# Patient Record
Sex: FEMALE | Race: OTHER | NOT HISPANIC OR LATINO | URBAN - METROPOLITAN AREA
[De-identification: names, ages, dates, MRNs, and addresses within clinical notes are randomized per-mention and may not be internally consistent; named-entity substitution may affect disease eponyms.]

---

## 2017-01-15 ENCOUNTER — EMERGENCY (EMERGENCY)
Facility: HOSPITAL | Age: 32
LOS: 1 days | Discharge: HOME | End: 2017-01-15
Attending: EMERGENCY MEDICINE | Admitting: EMERGENCY MEDICINE
Payer: SELF-PAY

## 2017-01-15 VITALS
HEART RATE: 69 BPM | OXYGEN SATURATION: 100 % | DIASTOLIC BLOOD PRESSURE: 65 MMHG | TEMPERATURE: 98 F | RESPIRATION RATE: 18 BRPM | SYSTOLIC BLOOD PRESSURE: 109 MMHG

## 2017-01-15 VITALS
TEMPERATURE: 98 F | HEART RATE: 73 BPM | DIASTOLIC BLOOD PRESSURE: 75 MMHG | SYSTOLIC BLOOD PRESSURE: 116 MMHG | OXYGEN SATURATION: 98 % | RESPIRATION RATE: 18 BRPM

## 2017-01-15 DIAGNOSIS — R10.9 UNSPECIFIED ABDOMINAL PAIN: ICD-10-CM

## 2017-01-15 DIAGNOSIS — N30.01 ACUTE CYSTITIS WITH HEMATURIA: ICD-10-CM

## 2017-01-15 DIAGNOSIS — Z72.0 TOBACCO USE: ICD-10-CM

## 2017-01-15 LAB
ALBUMIN SERPL ELPH-MCNC: 3.7 G/DL — SIGNIFICANT CHANGE UP (ref 3.4–5)
ALP SERPL-CCNC: 44 U/L — SIGNIFICANT CHANGE UP (ref 40–120)
ALT FLD-CCNC: 16 U/L — SIGNIFICANT CHANGE UP (ref 12–42)
ANION GAP SERPL CALC-SCNC: 7 MMOL/L — LOW (ref 9–16)
APPEARANCE UR: CLEAR — SIGNIFICANT CHANGE UP
AST SERPL-CCNC: 15 U/L — SIGNIFICANT CHANGE UP (ref 15–37)
BACTERIA # UR AUTO: PRESENT /HPF
BILIRUB SERPL-MCNC: 0.8 MG/DL — SIGNIFICANT CHANGE UP (ref 0.2–1.2)
BILIRUB UR-MCNC: NEGATIVE — SIGNIFICANT CHANGE UP
BUN SERPL-MCNC: 8 MG/DL — SIGNIFICANT CHANGE UP (ref 7–23)
CALCIUM SERPL-MCNC: 9.1 MG/DL — SIGNIFICANT CHANGE UP (ref 8.5–10.5)
CHLORIDE SERPL-SCNC: 106 MMOL/L — SIGNIFICANT CHANGE UP (ref 96–108)
CK MB BLD-MCNC: <0.89 % — SIGNIFICANT CHANGE UP
CK MB CFR SERPL CALC: <0.5 NG/ML — LOW (ref 0.5–3.6)
CK SERPL-CCNC: 56 U/L — SIGNIFICANT CHANGE UP (ref 26–192)
CO2 SERPL-SCNC: 27 MMOL/L — SIGNIFICANT CHANGE UP (ref 22–31)
COLOR SPEC: YELLOW — SIGNIFICANT CHANGE UP
CREAT SERPL-MCNC: 0.84 MG/DL — SIGNIFICANT CHANGE UP (ref 0.5–1.3)
DIFF PNL FLD: (no result)
GLUCOSE SERPL-MCNC: 77 MG/DL — SIGNIFICANT CHANGE UP (ref 70–99)
GLUCOSE UR QL: NEGATIVE — SIGNIFICANT CHANGE UP
HCG UR QL: NEGATIVE — SIGNIFICANT CHANGE UP
HCT VFR BLD CALC: 37.5 % — SIGNIFICANT CHANGE UP (ref 34.5–45)
HGB BLD-MCNC: 12.3 G/DL — SIGNIFICANT CHANGE UP (ref 11.5–15.5)
HIV 1 & 2 AB SERPL IA.RAPID: SIGNIFICANT CHANGE UP
KETONES UR-MCNC: NEGATIVE — SIGNIFICANT CHANGE UP
LEUKOCYTE ESTERASE UR-ACNC: (no result)
LIDOCAIN IGE QN: 360 U/L — SIGNIFICANT CHANGE UP (ref 73–393)
MCHC RBC-ENTMCNC: 28 PG — SIGNIFICANT CHANGE UP (ref 27–34)
MCHC RBC-ENTMCNC: 32.8 G/DL — SIGNIFICANT CHANGE UP (ref 32–36)
MCV RBC AUTO: 85.2 FL — SIGNIFICANT CHANGE UP (ref 80–100)
NITRITE UR-MCNC: NEGATIVE — SIGNIFICANT CHANGE UP
PH UR: 6 — SIGNIFICANT CHANGE UP (ref 4–8.1)
PLATELET # BLD AUTO: 232 K/UL — SIGNIFICANT CHANGE UP (ref 150–400)
POTASSIUM SERPL-MCNC: 3.9 MMOL/L — SIGNIFICANT CHANGE UP (ref 3.5–5.3)
POTASSIUM SERPL-SCNC: 3.9 MMOL/L — SIGNIFICANT CHANGE UP (ref 3.5–5.3)
PROT SERPL-MCNC: 7.2 G/DL — SIGNIFICANT CHANGE UP (ref 6.4–8.2)
PROT UR-MCNC: (no result) MG/DL
RBC # BLD: 4.4 M/UL — SIGNIFICANT CHANGE UP (ref 3.8–5.2)
RBC # FLD: 12.9 % — SIGNIFICANT CHANGE UP (ref 10.3–16.9)
RBC CASTS # UR COMP ASSIST: (no result) /HPF
SODIUM SERPL-SCNC: 140 MMOL/L — SIGNIFICANT CHANGE UP (ref 132–145)
SP GR SPEC: 1.02 — SIGNIFICANT CHANGE UP (ref 1–1.03)
TROPONIN I SERPL-MCNC: <0.017 NG/ML — LOW (ref 0.02–0.06)
UROBILINOGEN FLD QL: 0.2 E.U./DL — SIGNIFICANT CHANGE UP
WBC # BLD: 4.8 K/UL — SIGNIFICANT CHANGE UP (ref 3.8–10.5)
WBC # FLD AUTO: 4.8 K/UL — SIGNIFICANT CHANGE UP (ref 3.8–10.5)
WBC UR QL: (no result) /HPF

## 2017-01-15 PROCEDURE — 99285 EMERGENCY DEPT VISIT HI MDM: CPT | Mod: 25

## 2017-01-15 PROCEDURE — 93010 ELECTROCARDIOGRAM REPORT: CPT

## 2017-01-15 RX ORDER — FAMOTIDINE 10 MG/ML
20 INJECTION INTRAVENOUS ONCE
Qty: 0 | Refills: 0 | Status: COMPLETED | OUTPATIENT
Start: 2017-01-15 | End: 2017-01-15

## 2017-01-15 RX ORDER — FLUCONAZOLE 150 MG/1
1 TABLET ORAL
Qty: 1 | Refills: 0 | OUTPATIENT
Start: 2017-01-15

## 2017-01-15 RX ORDER — SODIUM CHLORIDE 9 MG/ML
1000 INJECTION INTRAMUSCULAR; INTRAVENOUS; SUBCUTANEOUS ONCE
Qty: 0 | Refills: 0 | Status: COMPLETED | OUTPATIENT
Start: 2017-01-15 | End: 2017-01-15

## 2017-01-15 RX ORDER — AZTREONAM 2 G
1 VIAL (EA) INJECTION
Qty: 20 | Refills: 0 | OUTPATIENT
Start: 2017-01-15 | End: 2017-01-25

## 2017-01-15 RX ORDER — ONDANSETRON 8 MG/1
4 TABLET, FILM COATED ORAL ONCE
Qty: 0 | Refills: 0 | Status: COMPLETED | OUTPATIENT
Start: 2017-01-15 | End: 2017-01-15

## 2017-01-15 RX ORDER — KETOROLAC TROMETHAMINE 30 MG/ML
30 SYRINGE (ML) INJECTION ONCE
Qty: 0 | Refills: 0 | Status: DISCONTINUED | OUTPATIENT
Start: 2017-01-15 | End: 2017-01-15

## 2017-01-15 RX ADMIN — ONDANSETRON 4 MILLIGRAM(S): 8 TABLET, FILM COATED ORAL at 11:55

## 2017-01-15 RX ADMIN — SODIUM CHLORIDE 2000 MILLILITER(S): 9 INJECTION INTRAMUSCULAR; INTRAVENOUS; SUBCUTANEOUS at 11:55

## 2017-01-15 RX ADMIN — Medication 1 TABLET(S): at 13:46

## 2017-01-15 RX ADMIN — Medication 30 MILLIGRAM(S): at 13:41

## 2017-01-15 RX ADMIN — FAMOTIDINE 20 MILLIGRAM(S): 10 INJECTION INTRAVENOUS at 11:55

## 2017-01-15 RX ADMIN — Medication 30 MILLIGRAM(S): at 11:55

## 2017-01-15 NOTE — ED ADULT NURSE NOTE - CHIEF COMPLAINT QUOTE
Pt states a week ago she has a bad UTI was treated in the hospital and was given a script for home. Pt admits to not taking medication at home b/c she said it got better. Now she feels pressure/ bloating in her abdomin and lower back pain. Last BM was yesterday.

## 2017-01-15 NOTE — ED PROVIDER NOTE - GASTROINTESTINAL NEGATIVE STATEMENT, MLM
no abdominal tenderness, no bloating, mild constipation, no diarrhea, no nausea and no vomiting. (is still able to eat and is having BMs ever other day instead of her normal every day)

## 2017-01-15 NOTE — ED PROVIDER NOTE - FAMILY HISTORY
Mother  Still living? Unknown  Family history of essential hypertension, Age at diagnosis: Age Unknown

## 2017-01-15 NOTE — ED ADULT NURSE NOTE - OBJECTIVE STATEMENT
Pt A&OX3. States pain nonspecific diffused abdominal pain that feels like constipation pressure. She took mag sulfate PO yesterday and had a BM with no pressure relief. Also complaints of lower back pain that she thinks she might have pulled her back planting. Pt denies N/V/D. Pt A&OX3. States pain nonspecific diffused abdominal pain that feels like constipation pressure. She took mag sulfate PO yesterday and had a BM with no pressure relief. Also complaints of lower back pain that she thinks she might have pulled her back planting. Pt A&OX3. States pain nonspecific diffused abdominal pain that feels like constipation pressure. She took mag citrate PO yesterday and had a BM with no pressure relief. Also complaints of lower back pain that she thinks she might have pulled her back planting.

## 2017-01-15 NOTE — ED ADULT TRIAGE NOTE - CHIEF COMPLAINT QUOTE
Pt states a week ago she has a bad UTI was treated in the hospital and was given a script for home. Pt admits to not taking medication as home b/c she said it got better. Now she feels pressure/ bloating in her abdomin and lower back pain. Last BM was yesterday.

## 2017-01-15 NOTE — ED PROVIDER NOTE - MEDICAL DECISION MAKING DETAILS
Patient with UTI diagnosed a month or so ago that she did not treat.  Felt somewhat better but now having symptoms again.  UA positive, no evidence of sepsis or pyelo.  Will start antibiotics and recommend close followup.  Abdomen is soft, nontender, without rebound or surgical signs.

## 2017-01-15 NOTE — ED PROVIDER NOTE - OBJECTIVE STATEMENT
30 y/o nontoxic well appearing well hydrated patient in no distress presents stating that a month or so ago she went to an ED because she was having UTI symptoms and was diagnosed with a UTI.  She was given her first dose of PO antibiotics in the department and was then discharged with a prescription but she elected not to fill it and instead used homeopathic remedies.  She had some improvement but over the next few weeks started to just feel "off", constipated, again with some UTI symptoms but also upper abdominal discomfort and intermittent mild chest discomfort.  No fever, chills, back pain, vomiting, diarrhea, rash, recent travel (went to [facility] in Nov but nothing since), ill contacts or other complaints.  No abdominal tenderness.

## 2017-07-27 ENCOUNTER — EMERGENCY (EMERGENCY)
Facility: HOSPITAL | Age: 32
LOS: 1 days | Discharge: HOME | End: 2017-07-27
Attending: EMERGENCY MEDICINE | Admitting: EMERGENCY MEDICINE
Payer: COMMERCIAL

## 2017-07-27 VITALS
DIASTOLIC BLOOD PRESSURE: 75 MMHG | OXYGEN SATURATION: 99 % | TEMPERATURE: 98 F | RESPIRATION RATE: 20 BRPM | SYSTOLIC BLOOD PRESSURE: 115 MMHG | HEART RATE: 80 BPM

## 2017-07-27 DIAGNOSIS — S91.011A LACERATION WITHOUT FOREIGN BODY, RIGHT ANKLE, INITIAL ENCOUNTER: ICD-10-CM

## 2017-07-27 DIAGNOSIS — M25.571 PAIN IN RIGHT ANKLE AND JOINTS OF RIGHT FOOT: ICD-10-CM

## 2017-07-27 DIAGNOSIS — F17.200 NICOTINE DEPENDENCE, UNSPECIFIED, UNCOMPLICATED: ICD-10-CM

## 2017-07-27 DIAGNOSIS — Z79.2 LONG TERM (CURRENT) USE OF ANTIBIOTICS: ICD-10-CM

## 2017-07-27 DIAGNOSIS — Z79.899 OTHER LONG TERM (CURRENT) DRUG THERAPY: ICD-10-CM

## 2017-07-27 PROCEDURE — 73610 X-RAY EXAM OF ANKLE: CPT | Mod: 26,RT

## 2017-07-27 PROCEDURE — 12001 RPR S/N/AX/GEN/TRNK 2.5CM/<: CPT

## 2017-07-27 PROCEDURE — 99283 EMERGENCY DEPT VISIT LOW MDM: CPT | Mod: 25

## 2017-07-27 RX ORDER — TETANUS TOXOID, REDUCED DIPHTHERIA TOXOID AND ACELLULAR PERTUSSIS VACCINE, ADSORBED 5; 2.5; 8; 8; 2.5 [IU]/.5ML; [IU]/.5ML; UG/.5ML; UG/.5ML; UG/.5ML
0.5 SUSPENSION INTRAMUSCULAR ONCE
Qty: 0 | Refills: 0 | Status: COMPLETED | OUTPATIENT
Start: 2017-07-27 | End: 2017-07-27

## 2017-07-27 RX ORDER — IBUPROFEN 200 MG
600 TABLET ORAL ONCE
Qty: 0 | Refills: 0 | Status: COMPLETED | OUTPATIENT
Start: 2017-07-27 | End: 2017-07-27

## 2017-07-27 RX ADMIN — TETANUS TOXOID, REDUCED DIPHTHERIA TOXOID AND ACELLULAR PERTUSSIS VACCINE, ADSORBED 0.5 MILLILITER(S): 5; 2.5; 8; 8; 2.5 SUSPENSION INTRAMUSCULAR at 01:18

## 2017-07-27 RX ADMIN — Medication 600 MILLIGRAM(S): at 01:18
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
## 2017-07-27 NOTE — ED PROVIDER NOTE - PHYSICAL EXAMINATION
Gen - NAD, comfortable in stretcher,  non-toxic appearing  Skin - warm, dry, 2cm linear laceration to the R lateral malleolus region with active bleeding, no FB or bony/tendon exposure noted   CV - S1S2, R/R/R  Resp - CTAB, no r/r/w   GI - soft, ND, NT  MS - w/w/p, R ankle with mild edema and TTP around the wound site, no visible FB, d/c, crepitus or ecchymosis, NV intact, FROM Gen - NAD, comfortable in stretcher,  non-toxic appearing  Skin - warm, dry, 2cm linear laceration to the R lateral malleolus region with active bleeding, no FB or bony/tendon exposure noted   CV - S1S2, R/R/R  Resp - CTAB, no r/r/w   GI - soft, ND, NT  MS - w/w/p, R ankle with mild edema and TTP around the wound site, no visible FB, d/c, crepitus or ecchymosis, NV intact, FROM, ambulatory with slight limp
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
## 2017-07-27 NOTE — ED PROCEDURE NOTE - CPROC ED POST PROC CARE GUIDE1
Keep the cast/splint/dressing clean and dry./Instructed patient/caregiver regarding signs and symptoms of infection./Verbal/written post procedure instructions were given to patient/caregiver./Instructed patient/caregiver to follow-up with primary care physician.
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
## 2017-07-27 NOTE — ED PROVIDER NOTE - DIAGNOSTIC INTERPRETATION
Xray (wet reads) interpreted by AZAEL GREWAL   Xray ankle - +soft tissue swelling. no acute fx or dislocation, joint space intact, no FB noted, no effusion noted

## 2017-08-07 ENCOUNTER — EMERGENCY (EMERGENCY)
Facility: HOSPITAL | Age: 32
LOS: 1 days | Discharge: HOME | End: 2017-08-07
Admitting: EMERGENCY MEDICINE
Payer: COMMERCIAL

## 2017-08-07 VITALS
TEMPERATURE: 98 F | SYSTOLIC BLOOD PRESSURE: 107 MMHG | RESPIRATION RATE: 18 BRPM | HEART RATE: 89 BPM | OXYGEN SATURATION: 98 % | DIASTOLIC BLOOD PRESSURE: 77 MMHG

## 2017-08-07 DIAGNOSIS — G89.18 OTHER ACUTE POSTPROCEDURAL PAIN: ICD-10-CM

## 2017-08-07 DIAGNOSIS — Z48.02 ENCOUNTER FOR REMOVAL OF SUTURES: ICD-10-CM

## 2017-08-07 DIAGNOSIS — Z79.899 OTHER LONG TERM (CURRENT) DRUG THERAPY: ICD-10-CM

## 2017-08-07 DIAGNOSIS — R20.2 PARESTHESIA OF SKIN: ICD-10-CM

## 2017-08-07 PROCEDURE — 99283 EMERGENCY DEPT VISIT LOW MDM: CPT

## 2017-08-07 NOTE — ED ADULT NURSE NOTE - NS ED NURSE DC INFO COMPLEXITY
Straightforward: Basic instructions, no meds, no home treatment/Verbalized Understanding/Returned Demonstration/Simple: Patient demonstrates quick and easy understanding/Patient asked questions

## 2017-08-07 NOTE — ED PROVIDER NOTE - OBJECTIVE STATEMENT
32y F here for suture removal of right ankle laceration, which was repaired here at [facility] 10 days ago. Noted tingling to foot. Denies numbness, bruising, erythema, swelling, fever.

## 2017-08-07 NOTE — ED PROVIDER NOTE - MUSCULOSKELETAL, MLM
Spine appears normal, range of motion is not limited, no muscle or joint tenderness. FROM of RLE strength 5/5 in flexion/extension; lateral aspect well healed without dehiscence, fluctuance, or erythema. Spine appears normal, range of motion is not limited, no muscle or joint tenderness.

## 2017-08-07 NOTE — ED ADULT NURSE NOTE - CHPI ED SYMPTOMS NEG
no drainage/no redness/no red streaks/no inflammation/no bleeding/no bleeding at site/no chills/no fever/no purulent drainage/no pain

## 2017-08-07 NOTE — ED PROVIDER NOTE - CARE PLAN
Principal Discharge DX:	Visit for suture removal  Instructions for follow-up, activity and diet:	local wound care and vitamin E/emolient

## 2017-08-07 NOTE — ED PROVIDER NOTE - MEDICAL DECISION MAKING DETAILS
Suture removal to right ankle. Wound healing well, no erythema, no ecchymosis, no swelling. Suture removal to right ankle. Wound healing well, no erythema, no ecchymosis, no swelling. f/u pcp for continued care prn.

## 2017-08-07 NOTE — ED PROVIDER NOTE - SKIN, MLM
No evidence of rash. Pain to site of laceration, wound healing well, no erythema, no swelling, no ecchymosis.

## 2021-06-16 NOTE — ED PROCEDURE NOTE - CPROC ED SITE PREP1
Assessment:      anxiety disorder and Depression. Possible organic contributing causes are: none.     Plan:      Medications: Lexapro and Hydroxyzine.  Reviewed concept of anxiety as biochemical imbalance of neurotransmitters and rationale for treatment.  Follow up: 1 month.  Spent 25 minutes (>50% of visit) discussing the risks of anxiety disorder and Depression, the  pathophysiology, etiology, risks, and principles of treatment.      See patient instruction for detailed plan of care    Subjective:       Susanne Jenkins is a 24 y.o. female who presents for follow up of anxiety disorder, post traumatic stress  disorder and sleep disturbance. She has the following anxiety symptoms: difficulty concentrating, feelings of losing control, insomnia and panic attacks. Onset of symptoms was approximately several years ago. Symptoms have been gradually worsening since that time. She denies current suicidal and homicidal ideation. Family history significant for anxiety. Risk factors: positive family history in  father and mother and negative life event PTSD- does not disclose the event. Previous treatment includes Counselling. She complains of the following medication side effects: none.    She follows with a psychologist/counselor.  She informs me that she has a diagnosis of PTSD.  She does not disclose the events leading to this diagnosis.    She currently describes herself as not motivated to pursue further interest academically and personally.  She feels that she has all the great ideas but she is not able to put them in action.  She does admit that she has been drinking more than she should be as her boyfriend does like drinking beer.  She is also concerned about gaining weight.    She has had a Nexplanon since August 2016.  She has tolerated this very well.  However, for the last 3 weeks she has had intermittent spotting and bleeding.  She does admit to doing increased work hours and lack of exercise.    The  following portions of the patient's history were reviewed and updated as appropriate: allergies, current medications, past family history, past medical history, past social history, past surgical history and problem list.    No Known Allergies    Current Outpatient Prescriptions on File Prior to Visit   Medication Sig Dispense Refill     etonogestrel (NEXPLANON) 68 mg Impl implant 1 each by Subdermal route once.       No current facility-administered medications on file prior to visit.        Patient Active Problem List   Diagnosis     Decreased Concentrating Ability     Depression With Anxiety     Retinopathy of prematurity     Mild intermittent asthma     Nexplanon insertion     Snoring     Insomnia       Past Medical History:   Diagnosis Date     Depression With Anxiety     Created by Conversion      Mild intermittent asthma 12/17/2014     Retinopathy of prematurity 12/17/2014       History reviewed. No pertinent surgical history.    Family History   Problem Relation Age of Onset     Anxiety disorder Mother      Sleep apnea Mother      Anxiety disorder Father        Social History     Social History     Marital status: Single     Spouse name: N/A     Number of children: N/A     Years of education: N/A     Social History Main Topics     Smoking status: Former Smoker     Smokeless tobacco: Never Used      Comment: Quit after 6 months      Alcohol use Yes      Comment: More frequent recently     Drug use: No     Sexual activity: Yes     Birth control/ protection: Implant     Other Topics Concern     None     Social History Narrative         Patient is currently living with a roommate.  She works at [employer] as a /[employer].  She has studied psychology and also done a research project in [place].  Her parents live nearby.  She has a half sister who is older than her.        Carmen Alatorre MD  2/12/2018                             Review of Systems  Constitutional: negative  Respiratory:  "negative  Cardiovascular: negative  Gastrointestinal: negative      Objective:      /62  Pulse 72  Temp 98.1  F (36.7  C) (Oral)   Resp 16  Ht 5' 6\" (1.676 m)  Wt 159 lb 11.2 oz (72.4 kg)  BMI 25.78 kg/m2      Wt Readings from Last 7 Encounters:   02/12/18 159 lb 11.2 oz (72.4 kg)   09/07/17 146 lb 4 oz (66.3 kg)   01/26/17 143 lb (64.9 kg)   01/09/17 143 lb 3 oz (64.9 kg)   07/13/16 145 lb 1.9 oz (65.8 kg)   07/24/15 139 lb (63 kg)   06/02/15 141 lb 9.6 oz (64.2 kg)         General Appearance: healthy, alert, oriented and no distress  Good eye contact, normal speech and content, no flight of ideas. Animated during conversation.  Does get tearful occasionally during the course of interview.  Neurological exam: gait normal, alert and oriented X 3        " normal saline/povidone iodine

## 2021-11-16 NOTE — ED CLERICAL - CLERICAL COMMENTS
[employer] reports, \"The client has been scheduled for Thursday 11/18/2021 @10am with Sunita for an assessment over the phone.\" LIZBETH Ariza and ROBINA Crisostomo were notified. BETTE Pope-Discharge Specialist.11/16/2021        exit interview completed

## 2022-05-09 NOTE — ED PROCEDURE NOTE - CPROC ED LACERATION CLEANSED1
[facility] ([phone][facility] to get clarification on Dx  Patient voices that he never had a DVT only Tumors in both legs.  Please clarify DX so med management can update the records copious irrigation/cleansed/extensive cleaning

## 2023-01-01 NOTE — ED PROVIDER NOTE - NEUROLOGICAL, MLM
VSS, remains on ad kalyan feedings. Hiro scores throughout shift were 1,3,4. NPASS <3. Had moments where she was unable to quiet self and exaggerated response to stimuli, but was able rest/sleep comfortably in between cares. No spells or emesis this shift. Voiding, no stool this shift No contact from parents or foster mom this shift.Cont with POC.   Alert and oriented, no focal deficits, no motor or sensory deficits.

## 2025-03-26 NOTE — ED ADULT NURSE NOTE - LATERALITY
Pt coming for a pre-op:    Pre-Op: Nose surgery, April 4, [facility] in [place], Doctor's # [phone]     Future Appointments   Date Time Provider Department Center   4/1/2025  8:40 AM Dio Blake MD [facility]       right